# Patient Record
(demographics unavailable — no encounter records)

---

## 2024-10-17 NOTE — HISTORY OF PRESENT ILLNESS
[Home] : at home, [unfilled] , at the time of the visit. [Medical Office: ([facility])___] : at the medical office located in  [Verbal consent obtained from patient] : the patient, [unfilled] [FreeTextEntry1] :  Patient is a pleasant 41-year-old female with a BMI of 62 that had recent ventriculoperitoneal shunt placed by Dr. Weinstein. Patient presents to discuss obesity medication. She is not an appropriate surgical candidate at this time.   Patient was approved for and started on wegovy and presents for follow-up today. Patient currently on 2.4 mg of Wegovy.  She is lost almost 30 pounds since last being seen less than 3 months ago.  Doing very well with no reported adverse side effects.  Has just started the 2.4 mg dose last week.  Will see patient again in January for follow-up.  Patient had recent blood work done with her PCP and will fax results for review and upload.  Is seeing her physician for her  shunt this coming week and will hopefully get clearance to increase her physical activity.

## 2024-10-17 NOTE — HISTORY OF PRESENT ILLNESS
[Home] : at home, [unfilled] , at the time of the visit. [Medical Office: ([facility])___] : at the medical office located in  [Verbal consent obtained from patient] : the patient, [unfilled] [FreeTextEntry1] :  Patient is a pleasant 41-year-old female with a BMI of 62 that had recent ventriculoperitoneal shunt placed by Dr. Weinstein. Patient presents to discuss obesity medication. She is not an appropriate surgical candidate at this time.   Patient was approved for and started on wegovy and presents for follow-up today. Patient currently on 2.4 mg of Wegovy.  She is lost almost 30 pounds since last being seen less than 3 months ago.  Doing very well with no reported adverse side effects.  Has just started the 2.4 mg dose last week.  Will see patient again in January for follow-up.  Patient had recent blood work done with her PCP and will fax results for review and upload.  Is seeing her physician for her  shunt this coming week and will hopefully get clearance to increase her physical activity.

## 2024-10-17 NOTE — ASSESSMENT
[FreeTextEntry1] : In summary patient is a 41-year-old female who presents for obesity medicine consultation.  She has lost 30 pounds since last being seen 3 months ago.  Her BMI went from 57 to now a 52.3.  Doing very well on Wegovy and she has just increased to maximum 2.4 mg dose last week.  Will continue this treatment plan and see her in January for follow-up.  All questions and concerns answered at today's visit.

## 2024-10-23 NOTE — PHYSICAL EXAM
[General Appearance - Alert] : alert [General Appearance - In No Acute Distress] : in no acute distress [General Appearance - Well Nourished] : well nourished [Well-Healed] : well-healed [Oriented To Time, Place, And Person] : oriented to person, place, and time [Impaired Insight] : insight and judgment were intact [Affect] : the affect was normal [Person] : oriented to person [Place] : oriented to place [Time] : oriented to time [Motor Strength] : muscle strength was normal in all four extremities [] : no respiratory distress [Abnormal Walk] : normal gait [Involuntary Movements] : no involuntary movements were seen [Motor Tone] : muscle strength and tone were normal [Skin Color & Pigmentation] : normal skin color and pigmentation [Romberg's Sign] : Romberg's sign was negtive bicycle/scooter protective equipment (helmets/pads)

## 2024-10-23 NOTE — ASSESSMENT
[FreeTextEntry1] : Discussion MRI head looks good- The weight loss will likely alleviate the ICP- no reason to remove the shunt  Keep shunt at 4 Ok lift light weights for toning purposes To be conservative repeat MRI in one year, if stable no need for routine imaging. Contact office with any questions or concerns  .IGurdeep evaluated the patient with the nurse practitioner Joann Barthelemy and established the plan of care. I personally discuss this patient with the nurse practitioner at the time of the visit. I agree with the assessment and plan as written, unless noted below.

## 2024-10-23 NOTE — PHYSICAL EXAM
[General Appearance - Alert] : alert [General Appearance - In No Acute Distress] : in no acute distress [General Appearance - Well Nourished] : well nourished [Well-Healed] : well-healed [Oriented To Time, Place, And Person] : oriented to person, place, and time [Impaired Insight] : insight and judgment were intact [Affect] : the affect was normal [Person] : oriented to person [Place] : oriented to place [Time] : oriented to time [Motor Strength] : muscle strength was normal in all four extremities [] : no respiratory distress [Abnormal Walk] : normal gait [Involuntary Movements] : no involuntary movements were seen [Motor Tone] : muscle strength and tone were normal [Skin Color & Pigmentation] : normal skin color and pigmentation [Romberg's Sign] : Romberg's sign was negtive

## 2024-10-23 NOTE — REASON FOR VISIT
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 2/2024 underwent placement of Certas Codman VPS and endonasal repair of the right frontal sinus.     Today she reports feeling well, denies nasal leak, headache or visual impairment  She also reports ~70lbs weight loss since surgery  Neuro Exam: intact.  Certas shunt at 4

## 2025-01-14 NOTE — HISTORY OF PRESENT ILLNESS
[Home] : at home, [unfilled] , at the time of the visit. [Medical Office: ([facility])___] : at the medical office located in  [Verbal consent obtained from patient] : the patient, [unfilled] [FreeTextEntry1] : Patient is a pleasant 41-year-old female with a BMI of 62 that had recent ventriculoperitoneal shunt placed by Dr. Weinstein. Patient presents to discuss obesity medication. She is not an appropriate surgical candidate at this time.  Patient was approved for and started on wegovy and presents for follow-up today. Patient currently on 2.4 mg of Wegovy.  Patient has lost 25 pounds since last being seen 3 months ago.  Doing well on Wegovy with no reported adverse negative side effects.  Patient states that she believes her premium has increased and would like to call [facility] today prior to me placing a refill for her medication.  She will inquire about price differences between Wegovy and Zepbound and may consider switching to Zepbound 7.5 mg dose based on medication availability and cost.  Patient also reports having complete blood work done at [facility] in October 2024 and will fax results to my office for review and upload.  In the interim, patient will increase her physical activity as discussed and tolerated to a minimum recommended 150 minutes/week of both cardiovascular and weight training methods.  Continues to eat healthfully and mindfully with a focus on high-quality proteins with decreasing carbohydrates fats and sugars.

## 2025-01-14 NOTE — ASSESSMENT
[FreeTextEntry1] : In summary patient presents for obesity medicine follow-up.  She was started on Wegovy and currently on maximum 2.4 mg dose with no reported adverse side effects.  Is doing quite well with the average weight loss of 8 pounds per month.  Will contact her insurance company to discuss price differences between Wegovy and Zepbound and may consider switching to Zepbound in the future.  For the time being we will continue to increase her physical activity as discussed.  She will see me every 3 months for follow-up.  All questions and concerns answered at today's visit.

## 2025-01-14 NOTE — HISTORY OF PRESENT ILLNESS
[Home] : at home, [unfilled] , at the time of the visit. [Medical Office: ([facility])___] : at the medical office located in  [Verbal consent obtained from patient] : the patient, [unfilled] [FreeTextEntry1] : Patient is a pleasant 41-year-old female with a BMI of 62 that had recent ventriculoperitoneal shunt placed by Dr. Weinstein. Patient presents to discuss obesity medication. She is not an appropriate surgical candidate at this time.  Patient was approved for and started on wegovy and presents for follow-up today. Patient currently on 2.4 mg of Wegovy.  Patient has lost 25 pounds since last being seen 3 months ago.  Doing well on Wegovy with no reported adverse negative side effects.  Patient states that she believes her premium has increased and would like to call [facility] today prior to me placing a refill for her medication.  She will inquire about price differences between Wegovy and Zepbound and may consider switching to Zepbound 7.5 mg dose based on medication availability and cost.  Patient also reports having complete blood work done at [facility] in October 2024 and will fax results to my office for review and upload.  In the interim, patient will increase her physical activity as discussed and tolerated to a minimum recommended 150 minutes/week of both cardiovascular and weight training methods.  Continues to eat healthfully and mindfully with a focus on high-quality proteins with decreasing carbohydrates fats and sugars.